# Patient Record
Sex: FEMALE | Race: WHITE | NOT HISPANIC OR LATINO | Employment: FULL TIME | URBAN - METROPOLITAN AREA
[De-identification: names, ages, dates, MRNs, and addresses within clinical notes are randomized per-mention and may not be internally consistent; named-entity substitution may affect disease eponyms.]

---

## 2022-02-21 NOTE — PROGRESS NOTES
"Chief Complaint   Patient presents with   • GI Bleeding   • Abdominal Pain           History of Present Illness  Patient is a 32-year-old female who presents today for evaluation.    Patient presents today with concerns about rectal bleeding.  This started January 2022.  She reports blood has been present on the toilet paper with wiping a few times but for the most part blood has consisted of being bright red specks in the stool.  It is occurring with around 90% of her bowel movements.  She reports rectal pain associated with this.  She generally has a bowel movement every other day to twice per day.    She has also been experiencing abdominal pain.  Pain is present to the mid abdomen and described as cramping.    She has had intermittent nausea but denies any vomiting.  Denies any bloating or distention.  Denies any heartburn or reflux.    She had an EGD in November that was unremarkable.  She reports she was hospitalized in October for a bowel obstruction.  She eventually passed a bowel movement and was discharged home and no treatment was needed.  She is uncertain because of the bowel obstruction.    She has had surgery for an ovarian cyst in the past.    She had recent lab work showing elevated sed rate and CRP.  She has been experiencing ankle pain and swelling.  She reports night sweats.      Result Review :       C-REACTIVE PROTEIN (02/11/2022 14:29)   CBC AND DIFFERENTIAL (02/11/2022 14:29)   SEDIMENTATION RATE (02/11/2022 14:29)   5-HIAA,Quantitative, timed urine (02/15/2022 15:00)   Metanephrines, Urine, 24 Hour - (02/15/2022 15:00)       Vital Signs:   /84   Pulse 88   Temp 98.2 °F (36.8 °C)   Ht 157.5 cm (62\")   Wt 78.6 kg (173 lb 3.2 oz)   SpO2 97%   BMI 31.68 kg/m²     Body mass index is 31.68 kg/m².     Physical Exam  Vitals reviewed.   Constitutional:       General: She is not in acute distress.     Appearance: She is well-developed.   HENT:      Head: Normocephalic and atraumatic. "   Pulmonary:      Effort: Pulmonary effort is normal. No respiratory distress.   Abdominal:      General: Abdomen is flat. Bowel sounds are normal. There is no distension.      Palpations: Abdomen is soft.      Tenderness: There is no abdominal tenderness.   Genitourinary:     Rectum: No anal fissure or external hemorrhoid.   Skin:     General: Skin is dry.      Coloration: Skin is not pale.   Neurological:      Mental Status: She is alert and oriented to person, place, and time.   Psychiatric:         Thought Content: Thought content normal.           Assessment and Plan    Diagnoses and all orders for this visit:    1. Rectal bleeding (Primary)  -     MRI Abdomen Pelvis Enterography; Future    2. Lower abdominal pain  -     MRI Abdomen Pelvis Enterography; Future    3. Abnormal CT scan, small bowel  -     MRI Abdomen Pelvis Enterography; Future         Discussion  Patient presents today with new complaint of rectal bleeding.  This is a new undiagnosed problem.  She has had some abdominal pain, joint swelling, night sweats, and elevated sed rate and CRP.  Recommended colonoscopy for further evaluation and to assess for any evidence of inflammatory bowel disease.  We will obtain copy of her recent CT scan for review and depending on results, may consider MRI enterography.    Addendum: Received patient's recent CT scan.  It showed gallbladder moderately distended with bile and mildly dilated loops of small bowel in the mid part of the abdomen.  Will obtain MRI enterography for further evaluation.      Follow Up   Return for Follow up to review results after testing complete.    Patient Instructions   Schedule colonoscopy for further evaluation.

## 2022-02-22 ENCOUNTER — PATIENT ROUNDING (BHMG ONLY) (OUTPATIENT)
Dept: GASTROENTEROLOGY | Facility: CLINIC | Age: 32
End: 2022-02-22

## 2022-02-22 ENCOUNTER — PREP FOR SURGERY (OUTPATIENT)
Dept: SURGERY | Facility: SURGERY CENTER | Age: 32
End: 2022-02-22

## 2022-02-22 ENCOUNTER — OFFICE VISIT (OUTPATIENT)
Dept: GASTROENTEROLOGY | Facility: CLINIC | Age: 32
End: 2022-02-22

## 2022-02-22 VITALS
HEART RATE: 88 BPM | WEIGHT: 173.2 LBS | BODY MASS INDEX: 31.87 KG/M2 | OXYGEN SATURATION: 97 % | SYSTOLIC BLOOD PRESSURE: 120 MMHG | HEIGHT: 62 IN | TEMPERATURE: 98.2 F | DIASTOLIC BLOOD PRESSURE: 84 MMHG

## 2022-02-22 DIAGNOSIS — R10.30 LOWER ABDOMINAL PAIN: ICD-10-CM

## 2022-02-22 DIAGNOSIS — K62.5 RECTAL BLEEDING: Primary | ICD-10-CM

## 2022-02-22 DIAGNOSIS — R93.3 ABNORMAL CT SCAN, SMALL BOWEL: ICD-10-CM

## 2022-02-22 PROCEDURE — 99203 OFFICE O/P NEW LOW 30 MIN: CPT | Performed by: NURSE PRACTITIONER

## 2022-02-22 RX ORDER — SODIUM CHLORIDE 0.9 % (FLUSH) 0.9 %
10 SYRINGE (ML) INJECTION AS NEEDED
Status: CANCELLED | OUTPATIENT
Start: 2022-02-22

## 2022-02-22 RX ORDER — FLUTICASONE PROPIONATE 50 MCG
2 SPRAY, SUSPENSION (ML) NASAL DAILY
COMMUNITY

## 2022-02-22 RX ORDER — FAMOTIDINE 20 MG/1
20 TABLET, FILM COATED ORAL 2 TIMES DAILY
COMMUNITY
Start: 2022-02-11

## 2022-02-22 RX ORDER — DROSPIRENONE AND ETHINYL ESTRADIOL 0.03MG-3MG
KIT ORAL
COMMUNITY
Start: 2022-02-08

## 2022-02-22 RX ORDER — MONTELUKAST SODIUM 10 MG/1
10 TABLET ORAL NIGHTLY
COMMUNITY
Start: 2021-12-15

## 2022-02-22 RX ORDER — LAMOTRIGINE 100 MG/1
200 TABLET ORAL DAILY
COMMUNITY
Start: 2022-02-08

## 2022-02-22 RX ORDER — SODIUM CHLORIDE 0.9 % (FLUSH) 0.9 %
3 SYRINGE (ML) INJECTION EVERY 12 HOURS SCHEDULED
Status: CANCELLED | OUTPATIENT
Start: 2022-02-22

## 2022-02-22 RX ORDER — MULTIPLE VITAMINS W/ MINERALS TAB 9MG-400MCG
1 TAB ORAL DAILY
COMMUNITY

## 2022-02-22 RX ORDER — CELECOXIB 200 MG/1
200 CAPSULE ORAL DAILY
COMMUNITY
Start: 2022-02-16 | End: 2022-12-05

## 2022-02-22 RX ORDER — SODIUM CHLORIDE, SODIUM LACTATE, POTASSIUM CHLORIDE, CALCIUM CHLORIDE 600; 310; 30; 20 MG/100ML; MG/100ML; MG/100ML; MG/100ML
30 INJECTION, SOLUTION INTRAVENOUS CONTINUOUS PRN
Status: CANCELLED | OUTPATIENT
Start: 2022-02-22

## 2022-02-24 ENCOUNTER — HOSPITAL ENCOUNTER (OUTPATIENT)
Facility: SURGERY CENTER | Age: 32
Setting detail: HOSPITAL OUTPATIENT SURGERY
Discharge: HOME | End: 2022-02-24
Attending: INTERNAL MEDICINE | Admitting: INTERNAL MEDICINE

## 2022-02-24 ENCOUNTER — ANESTHESIA (OUTPATIENT)
Dept: SURGERY | Facility: SURGERY CENTER | Age: 32
End: 2022-02-24

## 2022-02-24 ENCOUNTER — ANESTHESIA EVENT (OUTPATIENT)
Dept: SURGERY | Facility: SURGERY CENTER | Age: 32
End: 2022-02-24

## 2022-02-24 VITALS
WEIGHT: 170.4 LBS | HEART RATE: 75 BPM | DIASTOLIC BLOOD PRESSURE: 71 MMHG | TEMPERATURE: 97.3 F | RESPIRATION RATE: 17 BRPM | SYSTOLIC BLOOD PRESSURE: 126 MMHG | OXYGEN SATURATION: 98 % | HEIGHT: 62 IN | BODY MASS INDEX: 31.36 KG/M2

## 2022-02-24 DIAGNOSIS — K62.5 RECTAL BLEEDING: ICD-10-CM

## 2022-02-24 DIAGNOSIS — R10.30 LOWER ABDOMINAL PAIN: ICD-10-CM

## 2022-02-24 LAB
B-HCG UR QL: NEGATIVE
EXPIRATION DATE: NORMAL
INTERNAL NEGATIVE CONTROL: NEGATIVE
INTERNAL POSITIVE CONTROL: POSITIVE
Lab: NORMAL

## 2022-02-24 PROCEDURE — 45378 DIAGNOSTIC COLONOSCOPY: CPT | Performed by: INTERNAL MEDICINE

## 2022-02-24 PROCEDURE — 81025 URINE PREGNANCY TEST: CPT | Performed by: NURSE PRACTITIONER

## 2022-02-24 PROCEDURE — 25010000002 PROPOFOL 10 MG/ML EMULSION: Performed by: ANESTHESIOLOGY

## 2022-02-24 RX ORDER — PROPOFOL 10 MG/ML
VIAL (ML) INTRAVENOUS AS NEEDED
Status: DISCONTINUED | OUTPATIENT
Start: 2022-02-24 | End: 2022-02-24 | Stop reason: SURG

## 2022-02-24 RX ORDER — SODIUM CHLORIDE 0.9 % (FLUSH) 0.9 %
3 SYRINGE (ML) INJECTION EVERY 12 HOURS SCHEDULED
Status: DISCONTINUED | OUTPATIENT
Start: 2022-02-24 | End: 2022-02-24 | Stop reason: HOSPADM

## 2022-02-24 RX ORDER — SODIUM CHLORIDE, SODIUM LACTATE, POTASSIUM CHLORIDE, CALCIUM CHLORIDE 600; 310; 30; 20 MG/100ML; MG/100ML; MG/100ML; MG/100ML
INJECTION, SOLUTION INTRAVENOUS CONTINUOUS PRN
Status: DISCONTINUED | OUTPATIENT
Start: 2022-02-24 | End: 2022-02-24 | Stop reason: SURG

## 2022-02-24 RX ORDER — ALBUTEROL SULFATE 90 UG/1
2 AEROSOL, METERED RESPIRATORY (INHALATION) EVERY 4 HOURS PRN
COMMUNITY

## 2022-02-24 RX ORDER — PROPOFOL 10 MG/ML
VIAL (ML) INTRAVENOUS CONTINUOUS PRN
Status: DISCONTINUED | OUTPATIENT
Start: 2022-02-24 | End: 2022-02-24 | Stop reason: SURG

## 2022-02-24 RX ORDER — LIDOCAINE HYDROCHLORIDE 20 MG/ML
INJECTION, SOLUTION INFILTRATION; PERINEURAL AS NEEDED
Status: DISCONTINUED | OUTPATIENT
Start: 2022-02-24 | End: 2022-02-24 | Stop reason: SURG

## 2022-02-24 RX ORDER — DICYCLOMINE HYDROCHLORIDE 10 MG/1
10 CAPSULE ORAL 2 TIMES DAILY
Qty: 90 CAPSULE | Refills: 5 | OUTPATIENT
Start: 2022-02-24 | End: 2022-12-05

## 2022-02-24 RX ORDER — SODIUM CHLORIDE 0.9 % (FLUSH) 0.9 %
10 SYRINGE (ML) INJECTION AS NEEDED
Status: DISCONTINUED | OUTPATIENT
Start: 2022-02-24 | End: 2022-02-24 | Stop reason: HOSPADM

## 2022-02-24 RX ORDER — SODIUM CHLORIDE, SODIUM LACTATE, POTASSIUM CHLORIDE, CALCIUM CHLORIDE 600; 310; 30; 20 MG/100ML; MG/100ML; MG/100ML; MG/100ML
30 INJECTION, SOLUTION INTRAVENOUS CONTINUOUS PRN
Status: DISCONTINUED | OUTPATIENT
Start: 2022-02-24 | End: 2022-02-24 | Stop reason: HOSPADM

## 2022-02-24 RX ADMIN — SODIUM CHLORIDE, SODIUM LACTATE, POTASSIUM CHLORIDE, AND CALCIUM CHLORIDE: .6; .31; .03; .02 INJECTION, SOLUTION INTRAVENOUS at 07:30

## 2022-02-24 RX ADMIN — LIDOCAINE HYDROCHLORIDE 60 MG: 20 INJECTION, SOLUTION INFILTRATION; PERINEURAL at 07:33

## 2022-02-24 RX ADMIN — SODIUM CHLORIDE, POTASSIUM CHLORIDE, SODIUM LACTATE AND CALCIUM CHLORIDE 30 ML/HR: 600; 310; 30; 20 INJECTION, SOLUTION INTRAVENOUS at 06:36

## 2022-02-24 RX ADMIN — PROPOFOL 100 MG: 10 INJECTION, EMULSION INTRAVENOUS at 07:33

## 2022-02-24 RX ADMIN — Medication 160 MCG/KG/MIN: at 07:33

## 2022-02-24 NOTE — ANESTHESIA POSTPROCEDURE EVALUATION
"Patient: Eloise Foss    Procedure Summary     Date: 02/24/22 Room / Location: [facility]    Anesthesia Start: 0727 Anesthesia Stop: 0753    Procedure: COLONOSCOPY (N/A ) Diagnosis:       Rectal bleeding      Lower abdominal pain      (Rectal bleeding [K62.5])      (Lower abdominal pain [R10.30])    Surgeons: Ronald Huitron MD Provider: Dominic Rangel MD    Anesthesia Type: MAC ASA Status: 2          Anesthesia Type: MAC    Vitals  Vitals Value Taken Time   /76 02/24/22 0807   Temp 36.3 °C (97.3 °F) 02/24/22 0756   Pulse 73 02/24/22 0809   Resp 16 02/24/22 0756   SpO2 98 % 02/24/22 0809   Vitals shown include unvalidated device data.        Post Anesthesia Care and Evaluation    Patient location during evaluation: bedside  Patient participation: complete - patient participated  Level of consciousness: awake and alert  Pain management: adequate  Airway patency: patent  Anesthetic complications: No anesthetic complications    Cardiovascular status: acceptable  Respiratory status: acceptable  Hydration status: acceptable    Comments: /78 (BP Location: Left arm, Patient Position: Lying)   Pulse 87   Temp 36.3 °C (97.3 °F) (Temporal)   Resp 16   Ht 157.5 cm (62\")   Wt 77.3 kg (170 lb 6.4 oz)   LMP 06/12/2021   SpO2 100%   BMI 31.17 kg/m²       "

## 2022-02-28 NOTE — PROGRESS NOTES
February 22, 2022    Hello, may I speak with Eloise Foss?    My name is VELIA    I am  with [facility]  [address] [address] [phone].    Before we get started may I verify your date of birth? 1990 - VERIFIED    I am calling to officially welcome you to our practice and ask about your recent visit. Is this a good time to talk? YES    Tell me about your visit with us. What things went well?  PATIENT VERY HAPPY WITH OFFICE VISIT THIS MORNING       We're always looking for ways to make our patients' experiences even better. Do you have recommendations on ways we may improve? NO    Overall were you satisfied with your first visit to our practice? YES       I appreciate you taking the time to speak with me today. Is there anything else I can do for you? NO      Thank you, and have a great day.

## 2022-03-16 ENCOUNTER — HOSPITAL ENCOUNTER (OUTPATIENT)
Dept: MRI IMAGING | Facility: HOSPITAL | Age: 32
End: 2022-03-16

## 2022-03-28 ENCOUNTER — HOSPITAL ENCOUNTER (OUTPATIENT)
Dept: MRI IMAGING | Facility: HOSPITAL | Age: 32
Discharge: HOME | End: 2022-03-28
Admitting: NURSE PRACTITIONER

## 2022-03-28 DIAGNOSIS — R93.3 ABNORMAL CT SCAN, SMALL BOWEL: ICD-10-CM

## 2022-03-28 DIAGNOSIS — R10.30 LOWER ABDOMINAL PAIN: ICD-10-CM

## 2022-03-28 DIAGNOSIS — K62.5 RECTAL BLEEDING: ICD-10-CM

## 2022-03-28 PROCEDURE — A9577 INJ MULTIHANCE: HCPCS | Performed by: NURSE PRACTITIONER

## 2022-03-28 PROCEDURE — 72197 MRI PELVIS W/O & W/DYE: CPT

## 2022-03-28 PROCEDURE — 0 GADOBENATE DIMEGLUMINE 529 MG/ML SOLUTION: Performed by: NURSE PRACTITIONER

## 2022-03-28 PROCEDURE — 74183 MRI ABD W/O CNTR FLWD CNTR: CPT

## 2022-03-28 RX ADMIN — GADOBENATE DIMEGLUMINE 15 ML: 529 INJECTION, SOLUTION INTRAVENOUS at 17:38

## 2022-04-06 NOTE — PROGRESS NOTES
"Chief Complaint   Patient presents with   • Abdominal Pain   • Nausea         History of Present Illness  Patient is a 32-year-old female who presents today for follow-up.  She was seen in the office February 2022 for rectal bleeding, abdominal pain, nausea.  She underwent a colonoscopy February 2022 with diverticulosis, left colon was moderately tortuous, and internal hemorrhoids.  Terminal ileum was normal.  MRI enterography was performed and showed minimal mucosal enhancement of the terminal ileum favored to represent incomplete distention.  MR enterography also showed stomach was moderately distended.    Prior work-up included an EGD November 2021 that was normal.  HIDA scan November 2021 was also normal.     Patient presents today for follow-up.  She reports she has had persistent symptoms since her last office visit.  Symptoms come and go but are present around 50% of the time.  She reports abdominal pain.  This is present to the epigastric area and also at times to the lower abdomen.  It is generally worse after she eats.  She reports nausea after eating and early satiety.    She reports her bowels have been moving regularly, generally on a daily basis.  Denies any further blood in the stool.    Result Review :       MRI Abdomen Pelvis Enterography (03/28/2022 18:20)   COLONOSCOPY (02/24/2022 07:16)   Office Visit with Kayleigh Hebert APRN (02/22/2022)   C-REACTIVE PROTEIN (02/11/2022 14:29)   CBC AND DIFFERENTIAL (02/11/2022 14:29)   SEDIMENTATION RATE (02/11/2022 14:29)   5-HIAA,Quantitative, timed urine (02/15/2022 15:00)   Metanephrines, Urine, 24 Hour - (02/15/2022 15:00)       Vital Signs:   /74   Pulse 82   Temp 97.9 °F (36.6 °C)   Ht 157.5 cm (62\")   Wt 76.9 kg (169 lb 8 oz)   SpO2 97%   BMI 31.00 kg/m²     Body mass index is 31 kg/m².     Physical Exam  Vitals reviewed.   Constitutional:       General: She is not in acute distress.     Appearance: She is well-developed.   HENT:      " Head: Normocephalic and atraumatic.   Pulmonary:      Effort: Pulmonary effort is normal. No respiratory distress.   Abdominal:      General: Abdomen is flat. Bowel sounds are normal. There is no distension.      Palpations: Abdomen is soft.      Tenderness: There is abdominal tenderness in the right lower quadrant.   Skin:     General: Skin is dry.      Coloration: Skin is not pale.   Neurological:      Mental Status: She is alert and oriented to person, place, and time.   Psychiatric:         Thought Content: Thought content normal.           Assessment and Plan    Diagnoses and all orders for this visit:    1. Lower abdominal pain (Primary)  -     Endoscopy, GI With Capsule    2. Epigastric pain  -     NM Gastric Emptying; Future    3. Nausea  -     NM Gastric Emptying; Future    4. Disorder of ileum  -     Endoscopy, GI With Capsule         Discussion  Patient presents today for follow-up after colonoscopy and MRI enterography.  MRI enterography with questionable mild enhancement of the terminal ileum favored to represent incomplete distention.  Evaluated ileum did appear normal on recent colonoscopy.  Patient presents today with concerns about persistent abdominal pain and nausea.  Reviewed colonoscopy and MRI enterography findings with patient.  Discussed with patient I suspect MRI findings were secondary to incomplete distention but due to persistent symptoms and duration of symptoms, we will arrange for capsule endoscopy to evaluate for any evidence of small bowel Crohn's disease.    With gastric distention on EGD and symptoms, I do have some suspicion for gastroparesis and we will arrange for gastric emptying study to evaluate for this.          Follow Up   Return for Follow up to review results after testing complete.    Patient Instructions   Schedule gastric emptying study to assess for gastroparesis.     Schedule capsule endoscopy to evaluate for any evidence of Crohn's disease.

## 2022-04-08 ENCOUNTER — OFFICE VISIT (OUTPATIENT)
Dept: GASTROENTEROLOGY | Facility: CLINIC | Age: 32
End: 2022-04-08

## 2022-04-08 VITALS
BODY MASS INDEX: 31.19 KG/M2 | OXYGEN SATURATION: 97 % | WEIGHT: 169.5 LBS | HEART RATE: 82 BPM | HEIGHT: 62 IN | SYSTOLIC BLOOD PRESSURE: 128 MMHG | DIASTOLIC BLOOD PRESSURE: 74 MMHG | TEMPERATURE: 97.9 F

## 2022-04-08 DIAGNOSIS — R11.0 NAUSEA: ICD-10-CM

## 2022-04-08 DIAGNOSIS — R10.13 EPIGASTRIC PAIN: ICD-10-CM

## 2022-04-08 DIAGNOSIS — R10.30 LOWER ABDOMINAL PAIN: Primary | ICD-10-CM

## 2022-04-08 DIAGNOSIS — K63.9 DISORDER OF ILEUM: ICD-10-CM

## 2022-04-08 PROCEDURE — 99214 OFFICE O/P EST MOD 30 MIN: CPT | Performed by: NURSE PRACTITIONER

## 2022-04-08 RX ORDER — CHLORHEXIDINE GLUCONATE 0.12 MG/ML
RINSE ORAL
COMMUNITY
Start: 2022-04-05

## 2022-04-08 NOTE — PATIENT INSTRUCTIONS
Schedule gastric emptying study to assess for gastroparesis.     Schedule capsule endoscopy to evaluate for any evidence of Crohn's disease.

## 2022-04-21 ENCOUNTER — HOSPITAL ENCOUNTER (OUTPATIENT)
Dept: NUCLEAR MEDICINE | Facility: HOSPITAL | Age: 32
Discharge: HOME | End: 2022-04-21

## 2022-04-21 DIAGNOSIS — R11.0 NAUSEA: ICD-10-CM

## 2022-04-21 DIAGNOSIS — R10.13 EPIGASTRIC PAIN: ICD-10-CM

## 2022-04-21 PROCEDURE — 0 TECHNETIUM SULFUR COLLOID: Performed by: NURSE PRACTITIONER

## 2022-04-21 PROCEDURE — A9541 TC99M SULFUR COLLOID: HCPCS | Performed by: NURSE PRACTITIONER

## 2022-04-21 PROCEDURE — 78264 GASTRIC EMPTYING IMG STUDY: CPT

## 2022-04-21 RX ADMIN — TECHNETIUM TC 99M SULFUR COLLOID 1 DOSE: KIT at 07:08

## 2022-05-05 ENCOUNTER — TELEPHONE (OUTPATIENT)
Dept: GASTROENTEROLOGY | Facility: CLINIC | Age: 32
End: 2022-05-05

## 2022-05-17 ENCOUNTER — TELEPHONE (OUTPATIENT)
Dept: GASTROENTEROLOGY | Facility: CLINIC | Age: 32
End: 2022-05-17

## 2022-05-17 DIAGNOSIS — R10.30 LOWER ABDOMINAL PAIN: Primary | ICD-10-CM

## 2022-05-17 NOTE — TELEPHONE ENCOUNTER
Called patient and discussed results/recommendations from capsule endoscopy and note from cc. Patient verbalized understanding and will  stool specimen containers from lab. lb

## 2022-05-17 NOTE — TELEPHONE ENCOUNTER
Please let patient know Dr. Huitron has reviewed her capsule endoscopy.  There was no evidence of Crohn's disease.  There were a few abnormal appearing images which were likely from a small air bubble in the bowel but Dr. Huitron would recommend submitting stool sample to check ova and parasites to rule out intestinal parasite.  I placed order.

## 2022-05-18 ENCOUNTER — LAB (OUTPATIENT)
Dept: LAB | Facility: HOSPITAL | Age: 32
End: 2022-05-18

## 2022-05-18 PROCEDURE — 87209 SMEAR COMPLEX STAIN: CPT | Performed by: NURSE PRACTITIONER

## 2022-05-18 PROCEDURE — 87177 OVA AND PARASITES SMEARS: CPT | Performed by: NURSE PRACTITIONER

## 2022-08-18 ENCOUNTER — TRANSCRIBE ORDERS (OUTPATIENT)
Dept: ADMINISTRATIVE | Facility: HOSPITAL | Age: 32
End: 2022-08-18

## 2022-08-18 ENCOUNTER — LAB (OUTPATIENT)
Dept: LAB | Facility: HOSPITAL | Age: 32
End: 2022-08-18

## 2022-08-18 DIAGNOSIS — K11.20 SIALOADENITIS: Primary | ICD-10-CM

## 2022-08-18 DIAGNOSIS — K11.20 SIALOADENITIS: ICD-10-CM

## 2022-08-18 PROCEDURE — 36415 COLL VENOUS BLD VENIPUNCTURE: CPT

## 2022-08-18 PROCEDURE — 86038 ANTINUCLEAR ANTIBODIES: CPT

## 2022-08-19 LAB — ANA SER QL: NEGATIVE

## 2022-08-30 ENCOUNTER — LAB (OUTPATIENT)
Dept: LAB | Facility: HOSPITAL | Age: 32
End: 2022-08-30

## 2022-08-30 ENCOUNTER — TRANSCRIBE ORDERS (OUTPATIENT)
Dept: ADMINISTRATIVE | Facility: HOSPITAL | Age: 32
End: 2022-08-30

## 2022-08-30 DIAGNOSIS — M35.00 SICCA SYNDROME: ICD-10-CM

## 2022-08-30 DIAGNOSIS — D89.89 RADIATION CHIMERA: ICD-10-CM

## 2022-08-30 DIAGNOSIS — D89.89 RADIATION CHIMERA: Primary | ICD-10-CM

## 2022-08-30 PROCEDURE — 86235 NUCLEAR ANTIGEN ANTIBODY: CPT

## 2022-08-30 PROCEDURE — 36415 COLL VENOUS BLD VENIPUNCTURE: CPT

## 2022-08-31 LAB
ENA SS-A AB SER-ACNC: <0.2 AI (ref 0–0.9)
ENA SS-B AB SER-ACNC: <0.2 AI (ref 0–0.9)

## 2022-12-05 ENCOUNTER — HOSPITAL ENCOUNTER (EMERGENCY)
Facility: HOSPITAL | Age: 32
Discharge: HOME | End: 2022-12-05
Admitting: EMERGENCY MEDICINE
Payer: COMMERCIAL

## 2022-12-05 ENCOUNTER — APPOINTMENT (OUTPATIENT)
Dept: ULTRASOUND IMAGING | Facility: HOSPITAL | Age: 32
End: 2022-12-05
Payer: COMMERCIAL

## 2022-12-05 ENCOUNTER — APPOINTMENT (OUTPATIENT)
Dept: CT IMAGING | Facility: HOSPITAL | Age: 32
End: 2022-12-05
Payer: COMMERCIAL

## 2022-12-05 VITALS
RESPIRATION RATE: 18 BRPM | SYSTOLIC BLOOD PRESSURE: 108 MMHG | HEIGHT: 62 IN | BODY MASS INDEX: 31.28 KG/M2 | WEIGHT: 170 LBS | HEART RATE: 79 BPM | OXYGEN SATURATION: 99 % | TEMPERATURE: 97.9 F | DIASTOLIC BLOOD PRESSURE: 65 MMHG

## 2022-12-05 DIAGNOSIS — R11.2 NAUSEA AND VOMITING, UNSPECIFIED VOMITING TYPE: ICD-10-CM

## 2022-12-05 DIAGNOSIS — R10.84 GENERALIZED ABDOMINAL PAIN: Primary | ICD-10-CM

## 2022-12-05 LAB
ALBUMIN SERPL-MCNC: 4 G/DL (ref 3.5–5.2)
ALBUMIN/GLOB SERPL: 1.2 G/DL
ALP SERPL-CCNC: 99 U/L (ref 39–117)
ALT SERPL W P-5'-P-CCNC: 7 U/L (ref 1–33)
ANION GAP SERPL CALCULATED.3IONS-SCNC: 10.9 MMOL/L (ref 5–15)
AST SERPL-CCNC: 9 U/L (ref 1–32)
B-HCG UR QL: NEGATIVE
BASOPHILS # BLD AUTO: 0.03 10*3/MM3 (ref 0–0.2)
BASOPHILS NFR BLD AUTO: 0.1 % (ref 0–1.5)
BILIRUB SERPL-MCNC: 0.2 MG/DL (ref 0–1.2)
BILIRUB UR QL STRIP: NEGATIVE
BUN SERPL-MCNC: 9 MG/DL (ref 6–20)
BUN/CREAT SERPL: 12.3 (ref 7–25)
CALCIUM SPEC-SCNC: 9.1 MG/DL (ref 8.6–10.5)
CHLORIDE SERPL-SCNC: 106 MMOL/L (ref 98–107)
CLARITY UR: CLEAR
CO2 SERPL-SCNC: 19.1 MMOL/L (ref 22–29)
COLOR UR: YELLOW
CREAT SERPL-MCNC: 0.73 MG/DL (ref 0.57–1)
DEPRECATED RDW RBC AUTO: 37 FL (ref 37–54)
EGFRCR SERPLBLD CKD-EPI 2021: 112.2 ML/MIN/1.73
EOSINOPHIL # BLD AUTO: 0.16 10*3/MM3 (ref 0–0.4)
EOSINOPHIL NFR BLD AUTO: 0.8 % (ref 0.3–6.2)
ERYTHROCYTE [DISTWIDTH] IN BLOOD BY AUTOMATED COUNT: 12.2 % (ref 12.3–15.4)
GLOBULIN UR ELPH-MCNC: 3.4 GM/DL
GLUCOSE SERPL-MCNC: 103 MG/DL (ref 65–99)
GLUCOSE UR STRIP-MCNC: NEGATIVE MG/DL
HCT VFR BLD AUTO: 40.4 % (ref 34–46.6)
HGB BLD-MCNC: 13.7 G/DL (ref 12–15.9)
HGB UR QL STRIP.AUTO: NEGATIVE
IMM GRANULOCYTES # BLD AUTO: 0.05 10*3/MM3 (ref 0–0.05)
IMM GRANULOCYTES NFR BLD AUTO: 0.2 % (ref 0–0.5)
KETONES UR QL STRIP: NEGATIVE
LEUKOCYTE ESTERASE UR QL STRIP.AUTO: NEGATIVE
LIPASE SERPL-CCNC: 22 U/L (ref 13–60)
LYMPHOCYTES # BLD AUTO: 1.59 10*3/MM3 (ref 0.7–3.1)
LYMPHOCYTES NFR BLD AUTO: 7.5 % (ref 19.6–45.3)
MCH RBC QN AUTO: 27.8 PG (ref 26.6–33)
MCHC RBC AUTO-ENTMCNC: 33.9 G/DL (ref 31.5–35.7)
MCV RBC AUTO: 82.1 FL (ref 79–97)
MONOCYTES # BLD AUTO: 0.91 10*3/MM3 (ref 0.1–0.9)
MONOCYTES NFR BLD AUTO: 4.3 % (ref 5–12)
NEUTROPHILS NFR BLD AUTO: 18.52 10*3/MM3 (ref 1.7–7)
NEUTROPHILS NFR BLD AUTO: 87.1 % (ref 42.7–76)
NITRITE UR QL STRIP: NEGATIVE
PH UR STRIP.AUTO: 5.5 [PH] (ref 5–8)
PLATELET # BLD AUTO: 358 10*3/MM3 (ref 140–450)
PMV BLD AUTO: 9.2 FL (ref 6–12)
POTASSIUM SERPL-SCNC: 4 MMOL/L (ref 3.5–5.2)
PROT SERPL-MCNC: 7.4 G/DL (ref 6–8.5)
PROT UR QL STRIP: ABNORMAL
RBC # BLD AUTO: 4.92 10*6/MM3 (ref 3.77–5.28)
SODIUM SERPL-SCNC: 136 MMOL/L (ref 136–145)
SP GR UR STRIP: >=1.03 (ref 1–1.03)
UROBILINOGEN UR QL STRIP: ABNORMAL
WBC NRBC COR # BLD: 21.26 10*3/MM3 (ref 3.4–10.8)

## 2022-12-05 PROCEDURE — 99283 EMERGENCY DEPT VISIT LOW MDM: CPT | Performed by: PHYSICIAN ASSISTANT

## 2022-12-05 PROCEDURE — 81025 URINE PREGNANCY TEST: CPT

## 2022-12-05 PROCEDURE — 25010000002 ONDANSETRON PER 1 MG: Performed by: PHYSICIAN ASSISTANT

## 2022-12-05 PROCEDURE — 25010000002 DICYCLOMINE PER 20 MG: Performed by: PHYSICIAN ASSISTANT

## 2022-12-05 PROCEDURE — 83690 ASSAY OF LIPASE: CPT

## 2022-12-05 PROCEDURE — 76705 ECHO EXAM OF ABDOMEN: CPT

## 2022-12-05 PROCEDURE — 36415 COLL VENOUS BLD VENIPUNCTURE: CPT

## 2022-12-05 PROCEDURE — 74177 CT ABD & PELVIS W/CONTRAST: CPT

## 2022-12-05 PROCEDURE — 96372 THER/PROPH/DIAG INJ SC/IM: CPT

## 2022-12-05 PROCEDURE — 80053 COMPREHEN METABOLIC PANEL: CPT

## 2022-12-05 PROCEDURE — 0 IOPAMIDOL PER 1 ML: Performed by: PHYSICIAN ASSISTANT

## 2022-12-05 PROCEDURE — 96374 THER/PROPH/DIAG INJ IV PUSH: CPT

## 2022-12-05 PROCEDURE — 85025 COMPLETE CBC W/AUTO DIFF WBC: CPT

## 2022-12-05 PROCEDURE — 99283 EMERGENCY DEPT VISIT LOW MDM: CPT

## 2022-12-05 PROCEDURE — G0463 HOSPITAL OUTPT CLINIC VISIT: HCPCS | Performed by: PHYSICIAN ASSISTANT

## 2022-12-05 PROCEDURE — 81003 URINALYSIS AUTO W/O SCOPE: CPT

## 2022-12-05 RX ORDER — ONDANSETRON 2 MG/ML
4 INJECTION INTRAMUSCULAR; INTRAVENOUS ONCE
Status: COMPLETED | OUTPATIENT
Start: 2022-12-05 | End: 2022-12-05

## 2022-12-05 RX ORDER — DICYCLOMINE HCL 20 MG
20 TABLET ORAL EVERY 8 HOURS PRN
Qty: 20 TABLET | Refills: 0 | Status: SHIPPED | OUTPATIENT
Start: 2022-12-05

## 2022-12-05 RX ORDER — SODIUM CHLORIDE 0.9 % (FLUSH) 0.9 %
10 SYRINGE (ML) INJECTION AS NEEDED
Status: DISCONTINUED | OUTPATIENT
Start: 2022-12-05 | End: 2022-12-05 | Stop reason: HOSPADM

## 2022-12-05 RX ORDER — DICYCLOMINE HYDROCHLORIDE 10 MG/ML
20 INJECTION INTRAMUSCULAR ONCE
Status: COMPLETED | OUTPATIENT
Start: 2022-12-05 | End: 2022-12-05

## 2022-12-05 RX ORDER — ONDANSETRON 4 MG/1
4 TABLET, ORALLY DISINTEGRATING ORAL EVERY 8 HOURS PRN
Qty: 15 TABLET | Refills: 0 | Status: SHIPPED | OUTPATIENT
Start: 2022-12-05

## 2022-12-05 RX ADMIN — IOPAMIDOL 100 ML: 755 INJECTION, SOLUTION INTRAVENOUS at 16:05

## 2022-12-05 RX ADMIN — ONDANSETRON 4 MG: 2 INJECTION INTRAMUSCULAR; INTRAVENOUS at 17:17

## 2022-12-05 RX ADMIN — SODIUM CHLORIDE 1000 ML: 9 INJECTION, SOLUTION INTRAVENOUS at 16:07

## 2022-12-05 RX ADMIN — DICYCLOMINE HYDROCHLORIDE 20 MG: 10 INJECTION, SOLUTION INTRAMUSCULAR at 18:27

## 2022-12-05 NOTE — FSED PROVIDER NOTE
Subjective   History of Present Illness  Is a 32-year-old female states that she has had abdominal pain throughout her entire abdomen lower and upper right and left since this morning with associated nausea without vomiting.  Felt normal last night but this morning started with the pain.  She states that shoots through to her back and even up to her shoulder blades.  Denies chest pain or shortness of breath.  Denies vaginal discharge or bleeding denies urinary symptoms denies pregnancy.      Review of Systems  10 point review of systems reviewed and negative except as noted in the history of present illness pertinent to exam.      Past Medical History:   Diagnosis Date   • Arthritis, lumbar spine    • Asthma    • GERD (gastroesophageal reflux disease)    • PONV (postoperative nausea and vomiting)        Allergies   Allergen Reactions   • Penicillins Anaphylaxis   • Shellfish-Derived Products Anaphylaxis   • Latex Dermatitis and Itching   • Nickel Rash       Past Surgical History:   Procedure Laterality Date   • COLONOSCOPY     • COLONOSCOPY N/A 2/24/2022    Procedure: COLONOSCOPY;  Surgeon: Ronald Huitron MD;  Location: [facility] OR;  Service: Gastroenterology;  Laterality: N/A;  Adhesions and diverticulosis   • LAPAROSCOPIC OVARIAN CYSTECTOMY     • LASIK     • UPPER GASTROINTESTINAL ENDOSCOPY     • WISDOM TOOTH EXTRACTION         Family History   Problem Relation Age of Onset   • Colon polyps Mother    • Colon cancer Neg Hx    • Crohn's disease Neg Hx    • Irritable bowel syndrome Neg Hx    • Ulcerative colitis Neg Hx        Social History     Socioeconomic History   • Marital status:    Tobacco Use   • Smoking status: Never   • Smokeless tobacco: Never   Vaping Use   • Vaping Use: Never used   Substance and Sexual Activity   • Alcohol use: Yes     Comment: occasionally   • Drug use: Never   • Sexual activity: Defer           Objective   Physical Exam  Alert and oriented x3, no apparent distress.  Head  atraumatic.  Facies symmetrical.  Mucous membranes are moist.  Pupils equal round and reactive to light, extraocular movements intact.  Neck supple, trachea midline.  Nontender.  Good respiratory effort with no distress.  Lungs clear to auscultation equal bilaterally in all fields.  Heart regular rate and rhythm with no murmur noted.  Abdomen is soft, with tenderness in the right lower quadrant at McBurney's point and in the right upper quadrant with a positive Veloz sign.  Mild guarding is noted.  Skin without significant rashes or lesions.  Good turgor.  Warm and moist to the touch.  No acute neurologic deficit noted, clear speech.  Extremities without edema.  Calves are nontender.  Good affect, pleasant patient.    Procedures           ED Course                                           MDM  Gallbladder ultrasound performed  IMPRESSION:  1.     No sonographic findings to suggest cholecystitis or  cholelithiasis.  2.     Common duct was not well-visualized. No definite biliary ductal  dilatation was seen.  3.     Otherwise unremarkable right upper quadrant ultrasound.     Electronically Signed By-Neeraj Gunter MD On:12/5/2022 5:32 PM  This report was finalized on [id number] by  Neeraj Gunter MD.      CT scan abdomen and pelvis performed  FINDINGS:  Heart size appears within normal limits. No pericardial or pleural  effusion is seen. There is linear atelectasis or scarring in the left  lower lobe and right middle lobe.     No ectopic bowel gas. Liver, spleen, adrenal glands, gallbladder, and  pancreas appear grossly unremarkable. No hydronephrosis. No suspicious  renal lesion is seen. No definite obstructing calculus is seen. Aorta  appears normal in caliber. Mesenteric vessels appear to enhance as  expected. No significant abdominal adenopathy is seen. No acute gastric  abnormality is identified. No significant small bowel dilatation. The  appendix does not appear significantly enlarged or  demonstrate  significant findings of acute inflammation at this time. No acute  colonic or rectal abnormality is identified. No significant pelvic free  fluid. The bladder appears unremarkable. Uterus and adnexa appear within  normal limits for patient age on this limited evaluation. No definite  lymphadenopathy is seen. No aggressive osseous lesion is seen.     IMPRESSION:  No definite acute abnormality is identified explain patient's symptoms.     Electronically Signed By-Neeraj Gunter MD On:12/5/2022 4:15 PM  This report was finalized on [id number] by  Neeraj Gunter MD.      Patient was treated for nausea and abdominal cramping she states she has had cases like this before and has an appointment to follow-up with her family physician.  She does have a significant leukocytosis but suspicion for pneumonia is low no sign of UTI and the appendix was visualized on CT scan and does not show signs of appendicitis there is no sign of cholecystitis or pancreatitis.  Patient has abdominal pain with vomiting but has not yet developed diarrhea this could be early gastroenteritis but I do discussed this in detail with the patient we will treat symptomatically with Zofran and Bentyl and close follow-up with family physician.  I also discussed that early appendicitis may look like anything if this pain worsens she would needs prompt reevaluation she should be reevaluated within the next 2 days or back in the emergency department for any worsening symptoms.      Final diagnoses:   Generalized abdominal pain   Nausea and vomiting, unspecified vomiting type       ED Disposition  ED Disposition     ED Disposition   Discharge    Condition   Stable    Comment   --             Peyton Taylor, APRN  [address] [id number]  [phone]    In 3 days           Medication List      New Prescriptions    dicyclomine 20 MG tablet  Commonly known as: BENTYL  Take 1 tablet by mouth Every 8 (Eight) Hours  As Needed (abdominal pain).  Replaces: dicyclomine 10 MG capsule     ondansetron ODT 4 MG disintegrating tablet  Commonly known as: ZOFRAN-ODT  Place 1 tablet on the tongue Every 8 (Eight) Hours As Needed for Nausea or Vomiting.        Stop    celecoxib 200 MG capsule  Commonly known as: CeleBREX     dicyclomine 10 MG capsule  Commonly known as: BENTYL  Replaced by: dicyclomine 20 MG tablet           Where to Get Your Medications      These medications were sent to [id number] - [facility] [place] - [address] AT [address] - [phone]  - [phone] FX  [address] [place] [address]    Phone: [phone]   · dicyclomine 20 MG tablet  · ondansetron ODT 4 MG disintegrating tablet

## 2022-12-05 NOTE — DISCHARGE INSTRUCTIONS
At this time your CT scan and gallbladder ultrasounds of your abdomen are negative.  There is no sign of appendicitis or infection of the gallbladder but should symptoms worsen you should get prompt reevaluation in the emergency department.  We will treat for abdominal cramping and vomiting.  Please follow-up promptly with your family provider or return to the emergency department.    The emergency department evaluation is not a complete evaluation, you are always required to follow-up with another doctor within the next few days to assess how your symptoms are progressing and to ensure that there is no indication for further testing or returning to the hospital.    Even with treatment sometimes your condition worsens and you will need to return to the hospital.    If you are having pain and it is getting worse, you should return to the hospital.    If you have new symptoms that were not addressed at the original visit, you should return to the hospital.    If you are having difficulty breathing but it is getting worse, you should return to the hospital.    If you are vomiting and cannot take medications that were prescribed you should return to the hospital.    If you are having persistent fevers, you should return to the hospital.      If you have any questions or whether or not your symptoms are serious enough or for any other urgent concerns-always return to the hospital for repeat evaluation.

## (undated) DEVICE — FLEX ADVANTAGE 1500CC: Brand: FLEX ADVANTAGE

## (undated) DEVICE — CANN NASL CO2 TRULINK W/O2 A/

## (undated) DEVICE — Device

## (undated) DEVICE — GOWN PROC ENDOARMOR GI LVL3 HY/SHLD UNIV

## (undated) DEVICE — KT ORCA ORCAPOD DISP STRL